# Patient Record
Sex: FEMALE | Race: WHITE | Employment: FULL TIME | URBAN - METROPOLITAN AREA
[De-identification: names, ages, dates, MRNs, and addresses within clinical notes are randomized per-mention and may not be internally consistent; named-entity substitution may affect disease eponyms.]

---

## 2018-12-27 ENCOUNTER — APPOINTMENT (OUTPATIENT)
Dept: GENERAL RADIOLOGY | Age: 48
End: 2018-12-27

## 2018-12-27 ENCOUNTER — HOSPITAL ENCOUNTER (EMERGENCY)
Age: 48
Discharge: HOME | End: 2018-12-27
Attending: EMERGENCY MEDICINE

## 2018-12-27 VITALS
BODY MASS INDEX: 55.32 KG/M2 | HEART RATE: 93 BPM | TEMPERATURE: 98.2 F | SYSTOLIC BLOOD PRESSURE: 90 MMHG | WEIGHT: 293 LBS | OXYGEN SATURATION: 91 % | RESPIRATION RATE: 23 BRPM | DIASTOLIC BLOOD PRESSURE: 56 MMHG | HEIGHT: 61 IN

## 2018-12-27 DIAGNOSIS — R07.9 CHEST PAIN, UNSPECIFIED TYPE: Primary | ICD-10-CM

## 2018-12-27 LAB
A/G RATIO: 0.7 (ref 1.1–2.2)
ALBUMIN SERPL-MCNC: 3 G/DL (ref 3.4–5)
ALP BLD-CCNC: 119 U/L (ref 40–129)
ALT SERPL-CCNC: 18 U/L (ref 10–40)
ANION GAP SERPL CALCULATED.3IONS-SCNC: 12 MMOL/L (ref 3–16)
AST SERPL-CCNC: 21 U/L (ref 15–37)
BASOPHILS ABSOLUTE: 0.1 K/UL (ref 0–0.2)
BASOPHILS RELATIVE PERCENT: 0.9 %
BILIRUB SERPL-MCNC: 0.5 MG/DL (ref 0–1)
BUN BLDV-MCNC: 15 MG/DL (ref 7–20)
CALCIUM SERPL-MCNC: 9.4 MG/DL (ref 8.3–10.6)
CHLORIDE BLD-SCNC: 100 MMOL/L (ref 99–110)
CO2: 21 MMOL/L (ref 21–32)
CREAT SERPL-MCNC: 0.7 MG/DL (ref 0.6–1.1)
EKG ATRIAL RATE: 91 BPM
EKG DIAGNOSIS: NORMAL
EKG P AXIS: 29 DEGREES
EKG P-R INTERVAL: 134 MS
EKG Q-T INTERVAL: 362 MS
EKG QRS DURATION: 72 MS
EKG QTC CALCULATION (BAZETT): 445 MS
EKG R AXIS: 17 DEGREES
EKG T AXIS: 43 DEGREES
EKG VENTRICULAR RATE: 91 BPM
EOSINOPHILS ABSOLUTE: 0.3 K/UL (ref 0–0.6)
EOSINOPHILS RELATIVE PERCENT: 3 %
GFR AFRICAN AMERICAN: >60
GFR NON-AFRICAN AMERICAN: >60
GLOBULIN: 4.3 G/DL
GLUCOSE BLD-MCNC: 135 MG/DL (ref 70–99)
HCT VFR BLD CALC: 42.3 % (ref 36–48)
HEMOGLOBIN: 14.1 G/DL (ref 12–16)
LYMPHOCYTES ABSOLUTE: 2.7 K/UL (ref 1–5.1)
LYMPHOCYTES RELATIVE PERCENT: 25.5 %
MCH RBC QN AUTO: 28.2 PG (ref 26–34)
MCHC RBC AUTO-ENTMCNC: 33.3 G/DL (ref 31–36)
MCV RBC AUTO: 84.6 FL (ref 80–100)
MONOCYTES ABSOLUTE: 0.5 K/UL (ref 0–1.3)
MONOCYTES RELATIVE PERCENT: 4.6 %
NEUTROPHILS ABSOLUTE: 6.9 K/UL (ref 1.7–7.7)
NEUTROPHILS RELATIVE PERCENT: 66 %
PDW BLD-RTO: 14.8 % (ref 12.4–15.4)
PLATELET # BLD: 372 K/UL (ref 135–450)
PMV BLD AUTO: 7.5 FL (ref 5–10.5)
POTASSIUM REFLEX MAGNESIUM: 4.2 MMOL/L (ref 3.5–5.1)
RBC # BLD: 5 M/UL (ref 4–5.2)
REASON FOR REJECTION: NORMAL
REJECTED TEST: NORMAL
SODIUM BLD-SCNC: 133 MMOL/L (ref 136–145)
TOTAL PROTEIN: 7.3 G/DL (ref 6.4–8.2)
TROPONIN: <0.01 NG/ML
TROPONIN: <0.01 NG/ML
WBC # BLD: 10.5 K/UL (ref 4–11)

## 2018-12-27 PROCEDURE — 84484 ASSAY OF TROPONIN QUANT: CPT

## 2018-12-27 PROCEDURE — 85025 COMPLETE CBC W/AUTO DIFF WBC: CPT

## 2018-12-27 PROCEDURE — 99285 EMERGENCY DEPT VISIT HI MDM: CPT

## 2018-12-27 PROCEDURE — 80053 COMPREHEN METABOLIC PANEL: CPT

## 2018-12-27 PROCEDURE — 93005 ELECTROCARDIOGRAM TRACING: CPT | Performed by: PHYSICIAN ASSISTANT

## 2018-12-27 PROCEDURE — 71045 X-RAY EXAM CHEST 1 VIEW: CPT

## 2018-12-27 PROCEDURE — 93010 ELECTROCARDIOGRAM REPORT: CPT | Performed by: INTERNAL MEDICINE

## 2018-12-27 RX ORDER — LOSARTAN POTASSIUM AND HYDROCHLOROTHIAZIDE 25; 100 MG/1; MG/1
1 TABLET ORAL DAILY
COMMUNITY

## 2018-12-27 ASSESSMENT — ENCOUNTER SYMPTOMS
DIARRHEA: 0
VOMITING: 0
SHORTNESS OF BREATH: 0
ABDOMINAL PAIN: 0
BACK PAIN: 0
CHEST TIGHTNESS: 0
NAUSEA: 0
CONSTIPATION: 0
COUGH: 0
COLOR CHANGE: 0

## 2018-12-27 ASSESSMENT — HEART SCORE: ECG: 0

## 2018-12-27 ASSESSMENT — PAIN DESCRIPTION - LOCATION: LOCATION: CHEST

## 2018-12-27 ASSESSMENT — PAIN SCALES - GENERAL: PAINLEVEL_OUTOF10: 1

## 2018-12-27 NOTE — ED PROVIDER NOTES
normal. No respiratory distress. She has no wheezes. She has no rales. Musculoskeletal: Normal range of motion. Neurological: She is alert and oriented to person, place, and time. Skin: Skin is warm and dry. She is not diaphoretic. No pallor. Psychiatric: She has a normal mood and affect. Her behavior is normal.   Nursing note and vitals reviewed. PAST MEDICAL HISTORY     Past Medical History:   Diagnosis Date    Asthma     Back injury     two herniated lumbar disc    Hypertension     MI (myocardial infarction) ([facility] [address]     Miscarriage 1993       SURGICAL HISTORY       Past Surgical History:   Procedure Laterality Date    DILATION AND CURETTAGE OF UTERUS  11/19/14    Novasure    HYSTEROSCOPY  11/19/14    TONSILLECTOMY      TUBAL LIGATION      Age 22    TUBAL LIGATION         CURRENT MEDICATIONS       Previous Medications    ALBUTEROL SULFATE  (90 BASE) MCG/ACT INHALER    Use 2 puffs 4 times daily for 7 days then as needed for wheezing. Dispense with Spacer and instruct in use. At patient's preference may use 60 dose MDI. May Sub Pro-Air or Proventil as needed per insurance. CYCLOBENZAPRINE (FLEXERIL) 10 MG TABLET    Take 10 mg by mouth 3 times daily as needed for Muscle spasms. ETODOLAC (LODINE) 400 MG TABLET    Take 1 tablet by mouth 2 times daily for 10 days. ETODOLAC (LODINE) 400 MG TABLET    Take 1 tablet by mouth 2 times daily for 10 days. IBUPROFEN (ADVIL;MOTRIN) 800 MG TABLET    Take 1 tablet by mouth every 6 hours as needed for Pain. LISINOPRIL (PRINIVIL;ZESTRIL) 5 MG TABLET    Take 5 mg by mouth daily. LOSARTAN-HYDROCHLOROTHIAZIDE (HYZAAR) 100-25 MG PER TABLET    Take 1 tablet by mouth daily    NITROFURANTOIN, MACROCRYSTAL-MONOHYDRATE, (MACROBID) 100 MG CAPSULE    Take 100 mg by mouth 2 times daily. x7days starting 11/17    TRAMADOL (ULTRAM) 50 MG TABLET    Take 50 mg by mouth every 6 hours as needed for Pain.        ALLERGIES     Biaxin [clarithromycin] and Penicillins    FAMILY HISTORY       Family History   Problem Relation Age of Onset    Arthritis Father     Asthma Father     Cancer Father         lung-smoker    Diabetes Father     Hearing Loss Father     High Blood Pressure Father     High Cholesterol Father     Early Death Mother     Heart Disease Mother     High Blood Pressure Mother     Kidney Disease Mother    Karron Boast / Martinei Mother    [facility] Vision Loss Mother     Birth Defects Son         extra fingers and toes    Birth Defects Son         extra fingers and toes    High Blood Pressure Sister     High Cholesterol Sister     Kidney Disease Sister     High Blood Pressure Brother     Migraines Paternal Grandmother         SOCIAL HISTORY       Social History     Social History    Marital status:      Spouse name: N/A    Number of children: N/A    Years of education: N/A     Social History Main Topics    Smoking status: Current Every Day Smoker     Packs/day: 0.25     Years: 21.00     Types: Cigarettes    Smokeless tobacco: Current User    Alcohol use No    Drug use: No    Sexual activity: Yes     Partners: Male     Other Topics Concern    None     Social History Narrative    None       SCREENINGS      Heart Score for chest pain patients  History: Slightly Suspicious  ECG: Normal  Patient Age: > 39 and < 65 years  *Risk factors for Atherosclerotic disease: Hypertension, Hypercholesterolemia, Obesity  Risk Factors: > 3 Risk factors or history of atherosclerotic disease*  Troponin: < 1X normal limit  Heart Score Total: 3    DIAGNOSTIC RESULTS   LABS:    Labs Reviewed   COMPREHENSIVE METABOLIC PANEL W/ REFLEX TO MG FOR LOW K - Abnormal; Notable for the following:        Result Value    Sodium 133 (*)     Glucose 135 (*)     Alb 3.0 (*)     Albumin/Globulin Ratio 0.7 (*)     All other components within normal limits    Narrative:     CALL  Martinez  [facility] tel. [phone],  Rejected Test Name/Called to: valdo caldwell, 12/27/2018 14:25, by DECDA  Performed at:  [facility] [address],  [facility], [address]   Phone [phone]   TROPONIN    Narrative:     Performed at:  [facility] [address],  [facility], [address]   Phone [phone]   SPECIMEN REJECTION    Narrative:     Lupillo Dee. [phone],  Rejected Test Name/Called to: valdo caldwell, 12/27/2018 14:25, by DECDA  Performed at:  [facility] [address],  [facility], [address]   Phone [phone]   CBC WITH AUTO DIFFERENTIAL    Narrative:     Performed at:  [facility] [address],  [facility], [address]   Phone [phone]   TROPONIN    Narrative:     Performed at:  [facility] [address],  [facility], [address]   Phone [phone]       All other labs were within normal range or not returned as of this dictation. EKG: All EKG's areinterpreted by the Emergency Department Physician who either signs or Co-signs this chart in the absence of a cardiologist.    RADIOLOGY:   Non-plain film images such as CT, Ultrasound and MRI are read by the radiologist. Paddy Juan radiographicimages are visualized and preliminarily interpreted by the  ED Provider with the below findings:    Interpretation per the Radiologist below, if available at the time of this note:    XR CHEST PORTABLE   Final Result   1. Somewhat limited examination as detailed above. 2. Low lung volumes with a suggestion of pulmonary vascular congestion. 3. No pneumothorax. 4. Dedicated PA and lateral views of the chest could be of value when the   patient is better able to tolerate imaging. Xr Chest Portable    Result Date: 12/27/2018  EXAMINATION: SINGLE XRAY VIEW OF THE CHEST 12/27/2018 1:44 pm COMPARISON: Frontal view of the chest 02/21/2011, 01/29/2009.  HISTORY: ORDERING SYSTEM PROVIDED HISTORY: CP TECHNOLOGIST according to caregiver everywhere she did have normal stress test.  She states that recently her  has been going out of town for work and coming home on the weekends which is causing her to have increased stress in her life. Patient is currently feeling better without any shortness of breath or chest pain. Denies any focal numbness tingling or weakness. Pain did not radiate to through her back. Denies fevers or chills. No aggravating or alleviating factors. Again at this time patient is not complaining of any discomfort. On examination patient is well-appearing, she has normal discs are vascular exam.  The pressure has been slightly lower than her baseline. Patient does not feel lightheaded and weak or dizzy currently. She does not have any risk factors for pulmonary embolism. ER staff believes that her blood pressure is falsely low secondary to body habitus and difficulty placing the blood pressure cough. I low suspicion for pulmonary embolism she is not having any pleuritic component. Vitals are currently stable at discharge. She does not want to stay for further evaluation and would like to be discharged home if her repeat troponin is negative. The patient presents with chest pain. The patient has been evaluated and the history and physical exam suggest a benign etiology. I see nothing to suggest coronary ischemia, myocardial infarction, pulmonary embolism, thoracic aortic dissection, significant pericarditis, pneumonia, pneumothorax, or acute abdomen. I feel the patient can be safely discharged to home with outpatient follow up. Instructions have been given for the patient to return to the ED for any worsening of the symptoms, including but not limited to increased pain, shortness of breath, abdominal pain or weakness. The patient tolerated their visit well. They were seen and evaluated by the attending physician, who agreed with the assessment and plan.  I have discussed the findings of today's workup with the patient and addressed the patient's questions and concerns. Important warning signs as well as new orworsening symptoms which would necessitate immediate return to the ED were discussed. The plan is to discharge from the ED at this time, and the patient is in stable condition. The patient acknowledged understanding isagreeable with this plan. FINAL IMPRESSION      1.  Chest pain, unspecified type        DISPOSITION/PLAN   DISPOSITION Decision To Discharge 12/27/2018 04:11:18 PM      PATIENT REFERRED TO:  [facility] Emergency Department  [facility]  [address] [place] [address]  Go to   If symptoms worsen    [facility]) Pre-Services  [phone]          DISCHARGE MEDICATIONS:  New Prescriptions    No medications on file       DISCONTINUED MEDICATIONS:  Discontinued Medications    No medications on file            (Please note that portions of this note were completed with a voice recognition program.  Efforts were made to edit the dictations but occasionally words aremis-transcribed.)    HÉCTOR Nash (electronically signed)            HÉCTOR Ortega  12/27/18 [id number]

## 2018-12-27 NOTE — ED PROVIDER NOTES
I independently performed a history and physical on Savita Sierra. All diagnostic, treatment, and disposition decisions were made by myself in conjunction with the advanced practice provider. Briefly, this is a 53 y.o. female here for chest pain. This was earlier and is currently nonexistent. Radiate into her jaw. No shortness of breath. He states that she had a anxiety attack. No known modifying factors. .    On exam, morbidly obese. Nontender and symmetrical calves are present. Normal cardiac rate and rhythm. Normal pulmonary auscultation. GCS 15. Nontender obese abdomen. EKG  NSR without acute ischemia      Screenings     Heart Score for chest pain patients  History: Slightly Suspicious  ECG: Normal  Patient Age: > 39 and < 65 years  *Risk factors for Atherosclerotic disease: Hypertension, Hypercholesterolemia, Obesity  Risk Factors: > 3 Risk factors or history of atherosclerotic disease*  Troponin: < 1X normal limit  Heart Score Total: 3      MDM  I did offer the patient admission/hospitalization but she declined. The only way she tells me she would stay in the hospital is if her cardiac enzymes are elevated. We decide to undergo delta troponin method. Please see PA note. Pt wanted to go home and doesn't like hospitals. Patient Referrals:  [facility] Emergency Department  [address] [facility]  [phone]  Go to   If symptoms worsen    [facility]) Pre-Services  [phone]          Discharge Medications:  Discharge Medication List as of 12/27/2018  4:32 PM          FINAL IMPRESSION  1. Chest pain, unspecified type        Blood pressure (!) 90/56, pulse 93, temperature 98.2 °F (36.8 °C), temperature source Oral, resp. rate 23, height 5' 1\" (1.549 m), weight (!) 349 lb (158.3 kg), SpO2 91 %. For further details of Heidy Lundberg's emergency department encounter, please see documentation by advanced practice provider.         Marichuy Daley III,